# Patient Record
Sex: FEMALE | URBAN - METROPOLITAN AREA
[De-identification: names, ages, dates, MRNs, and addresses within clinical notes are randomized per-mention and may not be internally consistent; named-entity substitution may affect disease eponyms.]

---

## 2023-03-27 ENCOUNTER — OFFICE (OUTPATIENT)
Dept: URBAN - METROPOLITAN AREA CLINIC 109 | Facility: CLINIC | Age: 28
Setting detail: OPHTHALMOLOGY
End: 2023-03-27
Payer: COMMERCIAL

## 2023-03-27 DIAGNOSIS — H16.223: ICD-10-CM

## 2023-03-27 DIAGNOSIS — H52.13: ICD-10-CM

## 2023-03-27 DIAGNOSIS — H43.393: ICD-10-CM

## 2023-03-27 PROCEDURE — 92015 DETERMINE REFRACTIVE STATE: CPT | Performed by: OPHTHALMOLOGY

## 2023-03-27 PROCEDURE — 92004 COMPRE OPH EXAM NEW PT 1/>: CPT | Performed by: OPHTHALMOLOGY

## 2023-03-27 ASSESSMENT — REFRACTION_MANIFEST
OD_VA1: 20/20
OD_AXIS: 105
OS_SPHERE: -0.25
OS_AXIS: 95
OD_SPHERE: -0.50
OD_CYLINDER: -1.00
OS_CYLINDER: -0.75
OS_VA1: 20/20

## 2023-03-27 ASSESSMENT — CONFRONTATIONAL VISUAL FIELD TEST (CVF)
OD_FINDINGS: FULL
OS_FINDINGS: FULL

## 2023-03-27 ASSESSMENT — SPHEQUIV_DERIVED
OD_SPHEQUIV: -1.75
OS_SPHEQUIV: -1.625
OS_SPHEQUIV: -0.625
OD_SPHEQUIV: -1

## 2023-03-27 ASSESSMENT — REFRACTION_AUTOREFRACTION
OD_AXIS: 101
OS_CYLINDER: -0.75
OD_SPHERE: -1.50
OS_SPHERE: -1.25
OS_AXIS: 080
OD_CYLINDER: -0.50

## 2023-03-27 ASSESSMENT — VISUAL ACUITY
OS_BCVA: 20/20-
OD_BCVA: 20/20

## 2023-03-27 ASSESSMENT — REFRACTION_CURRENTRX
OD_AXIS: 105
OS_SPHERE: -0.25
OD_OVR_VA: 20/
OS_CYLINDER: -0.75
OS_OVR_VA: 20/
OS_AXIS: 095
OD_CYLINDER: -1.00
OD_SPHERE: PLANO

## 2023-04-17 ENCOUNTER — OFFICE (OUTPATIENT)
Dept: URBAN - METROPOLITAN AREA CLINIC 109 | Facility: CLINIC | Age: 28
Setting detail: OPHTHALMOLOGY
End: 2023-04-17
Payer: COMMERCIAL

## 2023-04-17 ENCOUNTER — APPOINTMENT (OUTPATIENT)
Dept: DERMATOLOGY | Facility: CLINIC | Age: 28
End: 2023-04-17
Payer: COMMERCIAL

## 2023-04-17 VITALS — WEIGHT: 150 LBS | HEIGHT: 65 IN | BODY MASS INDEX: 24.99 KG/M2

## 2023-04-17 DIAGNOSIS — L82.1 OTHER SEBORRHEIC KERATOSIS: ICD-10-CM

## 2023-04-17 DIAGNOSIS — H52.7: ICD-10-CM

## 2023-04-17 DIAGNOSIS — L85.3 XEROSIS CUTIS: ICD-10-CM

## 2023-04-17 PROCEDURE — 99203 OFFICE O/P NEW LOW 30 MIN: CPT

## 2023-04-17 PROCEDURE — GLASS CHEC GLASS RECHECK/ NO CHARGE: Performed by: OPHTHALMOLOGY

## 2023-04-17 ASSESSMENT — SPHEQUIV_DERIVED
OD_SPHEQUIV: -1.5
OS_SPHEQUIV: -1.375
OD_SPHEQUIV: -1
OS_SPHEQUIV: -0.625

## 2023-04-17 ASSESSMENT — REFRACTION_CURRENTRX
OD_SPHERE: PLANO
OS_CYLINDER: -0.75
OD_SPHERE: -0.50
OS_SPHERE: -0.25
OS_OVR_VA: 20/
OS_AXIS: 092
OD_AXIS: 105
OD_CYLINDER: -1.00
OS_AXIS: 88
OS_SPHERE: -0.25
OD_OVR_VA: 20/
OS_OVR_VA: 20/
OS_CYLINDER: -0.75
OD_OVR_VA: 20/
OD_CYLINDER: -1.00
OD_AXIS: 110

## 2023-04-17 ASSESSMENT — REFRACTION_MANIFEST
OS_AXIS: 95
OD_AXIS: 105
OD_SPHERE: -0.50
OS_SPHERE: -0.25
OS_CYLINDER: -0.75
OD_CYLINDER: -1.00
OD_VA1: 20/20
OS_VA1: 20/20

## 2023-04-17 ASSESSMENT — REFRACTION_AUTOREFRACTION
OD_CYLINDER: -0.50
OS_AXIS: 86
OD_SPHERE: -1.25
OS_CYLINDER: -0.75
OD_AXIS: 100
OS_SPHERE: -1.00

## 2023-04-17 ASSESSMENT — CONFRONTATIONAL VISUAL FIELD TEST (CVF)
OD_FINDINGS: FULL
OS_FINDINGS: FULL

## 2023-04-17 ASSESSMENT — VISUAL ACUITY
OS_BCVA: 20/20
OD_BCVA: 20/20

## 2023-04-17 NOTE — ASSESSMENT
[FreeTextEntry1] : #DPNs with skin tags,irritated - face and neck\par - Asx. I have discussed the nature and usual chronic course with the patient. Patient reassured\par - We discussed the risks, benefits, and alternatives of electrocautery \par - Discussed Emla and wrapping prior to electocautery\par - We discussed that treatment would be considered cosmetic with applicable out-of-pocket fees\par - $250 for 16-25 lesions. up to 16 is $210\par -Skin better hs  Alastin face cream qam as well recommended for skin texture\par -CeraVe cleanser\par - Patient c/o texture changes -- likely d/t small DPNs\par - Patient defers for now\par \par #Xerosis cutis\par - Discussed nature, chronicity and unpredictable course\par - Recommend liberal/frequent moisturization with non-fragranced creams (Cerave/Cetaphil creams) and lukewarm water for bathing. Counseled on the nature/course of this benign condition\par -Vanicream porducts\par \par #Horizontal neck lines\par - We discussed the risks, benefits, and botox - patient defers\par - Recommend OTC Alastin and Revision neck creams

## 2023-04-17 NOTE — PHYSICAL EXAM
[FreeTextEntry3] : tan stuck on papules on the face\par \par pedunculated dark brown papules on the neck and shoulders\par \par mild xerosis\par \par skin type IV\par \par static horizontal neck lines reviewed

## 2023-04-17 NOTE — HISTORY OF PRESENT ILLNESS
[FreeTextEntry1] : NPV spots [de-identified] : HANS CASTRO  is a pleasant 28 year old F, who presented for the following:\par \par - Bumps on her cheek/face. Growing more recently. Asx. Has had spots like these ever since she was 19.\par \par - Wishes to discuss general dry skin care \par \par - Asks about lines on the neck\par \par No p/f hx of skin cancer.\par

## 2023-06-13 ENCOUNTER — APPOINTMENT (OUTPATIENT)
Dept: DERMATOLOGY | Facility: CLINIC | Age: 28
End: 2023-06-13

## 2023-07-23 ENCOUNTER — NON-APPOINTMENT (OUTPATIENT)
Age: 28
End: 2023-07-23

## 2023-10-01 ENCOUNTER — EMERGENCY (EMERGENCY)
Facility: HOSPITAL | Age: 28
LOS: 1 days | Discharge: HOME | End: 2023-10-01
Attending: EMERGENCY MEDICINE | Admitting: EMERGENCY MEDICINE
Payer: COMMERCIAL

## 2023-10-01 VITALS
DIASTOLIC BLOOD PRESSURE: 74 MMHG | HEART RATE: 70 BPM | OXYGEN SATURATION: 97 % | RESPIRATION RATE: 19 BRPM | SYSTOLIC BLOOD PRESSURE: 101 MMHG | TEMPERATURE: 98 F

## 2023-10-01 VITALS
WEIGHT: 164.91 LBS | SYSTOLIC BLOOD PRESSURE: 103 MMHG | DIASTOLIC BLOOD PRESSURE: 75 MMHG | HEIGHT: 65 IN | TEMPERATURE: 97 F | RESPIRATION RATE: 17 BRPM | OXYGEN SATURATION: 96 % | HEART RATE: 74 BPM

## 2023-10-01 LAB
ALBUMIN SERPL ELPH-MCNC: 3.7 G/DL — SIGNIFICANT CHANGE UP (ref 3.3–5)
ALP SERPL-CCNC: 60 U/L — SIGNIFICANT CHANGE UP (ref 40–120)
ALT FLD-CCNC: 18 U/L — SIGNIFICANT CHANGE UP (ref 12–78)
ANION GAP SERPL CALC-SCNC: 7 MMOL/L — SIGNIFICANT CHANGE UP (ref 5–17)
AST SERPL-CCNC: 11 U/L — LOW (ref 15–37)
BASOPHILS # BLD AUTO: 0.03 K/UL — SIGNIFICANT CHANGE UP (ref 0–0.2)
BASOPHILS NFR BLD AUTO: 0.4 % — SIGNIFICANT CHANGE UP (ref 0–2)
BILIRUB SERPL-MCNC: 0.8 MG/DL — SIGNIFICANT CHANGE UP (ref 0.2–1.2)
BUN SERPL-MCNC: 13 MG/DL — SIGNIFICANT CHANGE UP (ref 7–23)
CALCIUM SERPL-MCNC: 8.6 MG/DL — SIGNIFICANT CHANGE UP (ref 8.5–10.1)
CHLORIDE SERPL-SCNC: 107 MMOL/L — SIGNIFICANT CHANGE UP (ref 96–108)
CO2 SERPL-SCNC: 24 MMOL/L — SIGNIFICANT CHANGE UP (ref 22–31)
CREAT SERPL-MCNC: 0.6 MG/DL — SIGNIFICANT CHANGE UP (ref 0.5–1.3)
EGFR: 126 ML/MIN/1.73M2 — SIGNIFICANT CHANGE UP
EOSINOPHIL # BLD AUTO: 0.09 K/UL — SIGNIFICANT CHANGE UP (ref 0–0.5)
EOSINOPHIL NFR BLD AUTO: 1.3 % — SIGNIFICANT CHANGE UP (ref 0–6)
GLUCOSE SERPL-MCNC: 110 MG/DL — HIGH (ref 70–99)
HCG SERPL-ACNC: <1 MIU/ML — SIGNIFICANT CHANGE UP
HCT VFR BLD CALC: 38.7 % — SIGNIFICANT CHANGE UP (ref 34.5–45)
HGB BLD-MCNC: 12.9 G/DL — SIGNIFICANT CHANGE UP (ref 11.5–15.5)
IMM GRANULOCYTES NFR BLD AUTO: 0.3 % — SIGNIFICANT CHANGE UP (ref 0–0.9)
LYMPHOCYTES # BLD AUTO: 1.67 K/UL — SIGNIFICANT CHANGE UP (ref 1–3.3)
LYMPHOCYTES # BLD AUTO: 24.8 % — SIGNIFICANT CHANGE UP (ref 13–44)
MCHC RBC-ENTMCNC: 27.9 PG — SIGNIFICANT CHANGE UP (ref 27–34)
MCHC RBC-ENTMCNC: 33.3 GM/DL — SIGNIFICANT CHANGE UP (ref 32–36)
MCV RBC AUTO: 83.6 FL — SIGNIFICANT CHANGE UP (ref 80–100)
MONOCYTES # BLD AUTO: 0.25 K/UL — SIGNIFICANT CHANGE UP (ref 0–0.9)
MONOCYTES NFR BLD AUTO: 3.7 % — SIGNIFICANT CHANGE UP (ref 2–14)
NEUTROPHILS # BLD AUTO: 4.67 K/UL — SIGNIFICANT CHANGE UP (ref 1.8–7.4)
NEUTROPHILS NFR BLD AUTO: 69.5 % — SIGNIFICANT CHANGE UP (ref 43–77)
NRBC # BLD: 0 /100 WBCS — SIGNIFICANT CHANGE UP (ref 0–0)
PLATELET # BLD AUTO: 275 K/UL — SIGNIFICANT CHANGE UP (ref 150–400)
POTASSIUM SERPL-MCNC: 3.9 MMOL/L — SIGNIFICANT CHANGE UP (ref 3.5–5.3)
POTASSIUM SERPL-SCNC: 3.9 MMOL/L — SIGNIFICANT CHANGE UP (ref 3.5–5.3)
PROT SERPL-MCNC: 7.6 G/DL — SIGNIFICANT CHANGE UP (ref 6–8.3)
RBC # BLD: 4.63 M/UL — SIGNIFICANT CHANGE UP (ref 3.8–5.2)
RBC # FLD: 12.2 % — SIGNIFICANT CHANGE UP (ref 10.3–14.5)
SODIUM SERPL-SCNC: 138 MMOL/L — SIGNIFICANT CHANGE UP (ref 135–145)
WBC # BLD: 6.73 K/UL — SIGNIFICANT CHANGE UP (ref 3.8–10.5)
WBC # FLD AUTO: 6.73 K/UL — SIGNIFICANT CHANGE UP (ref 3.8–10.5)

## 2023-10-01 PROCEDURE — 84702 CHORIONIC GONADOTROPIN TEST: CPT

## 2023-10-01 PROCEDURE — 99284 EMERGENCY DEPT VISIT MOD MDM: CPT

## 2023-10-01 PROCEDURE — 36415 COLL VENOUS BLD VENIPUNCTURE: CPT

## 2023-10-01 PROCEDURE — 80053 COMPREHEN METABOLIC PANEL: CPT

## 2023-10-01 PROCEDURE — 96374 THER/PROPH/DIAG INJ IV PUSH: CPT

## 2023-10-01 PROCEDURE — 96375 TX/PRO/DX INJ NEW DRUG ADDON: CPT

## 2023-10-01 PROCEDURE — 85025 COMPLETE CBC W/AUTO DIFF WBC: CPT

## 2023-10-01 PROCEDURE — 96361 HYDRATE IV INFUSION ADD-ON: CPT

## 2023-10-01 PROCEDURE — 99284 EMERGENCY DEPT VISIT MOD MDM: CPT | Mod: 25

## 2023-10-01 RX ORDER — KETOROLAC TROMETHAMINE 30 MG/ML
30 SYRINGE (ML) INJECTION ONCE
Refills: 0 | Status: DISCONTINUED | OUTPATIENT
Start: 2023-10-01 | End: 2023-10-01

## 2023-10-01 RX ORDER — ONDANSETRON 8 MG/1
4 TABLET, FILM COATED ORAL ONCE
Refills: 0 | Status: COMPLETED | OUTPATIENT
Start: 2023-10-01 | End: 2023-10-01

## 2023-10-01 RX ORDER — METOCLOPRAMIDE HCL 10 MG
10 TABLET ORAL ONCE
Refills: 0 | Status: COMPLETED | OUTPATIENT
Start: 2023-10-01 | End: 2023-10-01

## 2023-10-01 RX ORDER — ACETAMINOPHEN 500 MG
1000 TABLET ORAL ONCE
Refills: 0 | Status: COMPLETED | OUTPATIENT
Start: 2023-10-01 | End: 2023-10-01

## 2023-10-01 RX ORDER — SODIUM CHLORIDE 9 MG/ML
1000 INJECTION INTRAMUSCULAR; INTRAVENOUS; SUBCUTANEOUS ONCE
Refills: 0 | Status: COMPLETED | OUTPATIENT
Start: 2023-10-01 | End: 2023-10-01

## 2023-10-01 RX ADMIN — SODIUM CHLORIDE 1000 MILLILITER(S): 9 INJECTION INTRAMUSCULAR; INTRAVENOUS; SUBCUTANEOUS at 13:36

## 2023-10-01 RX ADMIN — SODIUM CHLORIDE 1000 MILLILITER(S): 9 INJECTION INTRAMUSCULAR; INTRAVENOUS; SUBCUTANEOUS at 15:17

## 2023-10-01 RX ADMIN — Medication 400 MILLIGRAM(S): at 13:35

## 2023-10-01 RX ADMIN — Medication 1000 MILLIGRAM(S): at 13:50

## 2023-10-01 RX ADMIN — SODIUM CHLORIDE 1000 MILLILITER(S): 9 INJECTION INTRAMUSCULAR; INTRAVENOUS; SUBCUTANEOUS at 12:23

## 2023-10-01 RX ADMIN — Medication 1000 MILLIGRAM(S): at 14:05

## 2023-10-01 RX ADMIN — Medication 10 MILLIGRAM(S): at 12:23

## 2023-10-01 RX ADMIN — SODIUM CHLORIDE 1000 MILLILITER(S): 9 INJECTION INTRAMUSCULAR; INTRAVENOUS; SUBCUTANEOUS at 13:30

## 2023-10-01 RX ADMIN — ONDANSETRON 4 MILLIGRAM(S): 8 TABLET, FILM COATED ORAL at 13:35

## 2023-10-01 RX ADMIN — Medication 30 MILLIGRAM(S): at 13:36

## 2023-10-01 RX ADMIN — Medication 30 MILLIGRAM(S): at 14:00

## 2023-10-01 NOTE — ED PROVIDER NOTE - CARE PROVIDER_API CALL
Cj Rodriguez  Neurology  [address] [place] [address]  Phone: [phone]  Fax: [phone]  Follow Up Time: 1-3 Days

## 2023-10-01 NOTE — ED PROVIDER NOTE - NS ED ATTENDING STATEMENT MOD
This was a shared visit with the LFORENTINO. I reviewed and verified the documentation and independently performed the documented:

## 2023-10-01 NOTE — ED PROVIDER NOTE - PATIENT PORTAL LINK FT
You can access the FollowMyHealth Patient Portal offered by [facility] by registering at the following website: [email or web address]. By joining [employer] [facility]’s FollowMyHealth portal, you will also be able to view your health information using other applications (apps) compatible with our system.

## 2023-10-01 NOTE — ED PROVIDER NOTE - PROGRESS NOTE DETAILS
Reevaluated patient at bedside. Patient notes improvement of symptoms. Discussed results with the patient and provided copies. [place] agreement to defer CT, pt reports she will return for persistent or worsening symptoms and prefers to follow up with neuro for MRI. All questions were answered. Discussed the importance of prompt, close medical follow-up. Patient will return with any changes, concerns or persistent/worsening symptoms.  Patient verbalized understanding.

## 2023-10-01 NOTE — ED PROVIDER NOTE - PHYSICAL EXAMINATION
Constitutional: Awake, Alert, non-toxic. NAD  HEAD: Normocephalic, atraumatic.   EYES: PERRL, EOM intact, conjunctiva and sclera are clear bilaterally. No raccoon eyes.   ENT: No rhinorrhea, normal pharynx, patent, no tonsillar exudate or enlargement, mucous membranes pink/moist, no erythema, no drooling or stridor.   NECK: Supple, non-tender  BACK: No midline or paraspinal TTP of cervical/thoracic/lumbar spine, FROM. No ecchymosis or hematomas.   CARDIOVASCULAR: Normal S1, S2; regular rate and rhythm.  RESPIRATORY: Normal respiratory effort; breath sounds CTAB, no wheezes, rhonchi, or rales. Speaking in full sentences. No accessory muscle use.   ABDOMEN: Soft; non-tender, non-distended  EXTREMITIES: Full passive and active ROM in all extremities; non-tender to palpation; distal pulses palpable and symmetric, no edema, no crepitus or step off  SKIN: Warm, dry; good skin turgor, no apparent lesions or rashes, no ecchymosis, brisk capillary refill.  NEURO: A&O x3. Sensory and motor functions are grossly intact. Speech is normal. Appearance and judgement seem appropriate for gender and age. No neurological deficits. Neurovascular sensation intact motor function 5/5 of upper and lower extremities, CN II-XII grossly intact, no ataxia, absent pronator drift, intact cerebellar function. Speech clear, without articulation or word-finding difficulties. Eyes- PERRL bilaterally. EOMs in tact. No nystagmus. No facial droop.

## 2023-10-01 NOTE — ED PROVIDER NOTE - CLINICAL SUMMARY MEDICAL DECISION MAKING FREE TEXT BOX
28-year-old female significant past medical history for migraine headaches presents to the ED with complaints of similar left-sided migraine headache times few days however now associated with nausea and vomiting.  Patient unable to tolerate her Advil secondary to nausea and vomiting.  Supportive care symptomatic relief and reassess for suspected side effects to migraine headache.

## 2023-10-01 NOTE — ED ADULT NURSE NOTE - OBJECTIVE STATEMENT
Pt. received alert and oriented x3 with chief complaint of multiple episodes of n/v since this morning.

## 2023-10-01 NOTE — ED PROVIDER NOTE - OBJECTIVE STATEMENT
28-year-old female without reported past medical history presents today due to headache and vomiting.  Patient reports she woke up at 9 AM with a right-sided temporal headache that is described as throbbing and 8 out of 10.  Patient reports that she vomited about 5 times.  Patient notes that she typically gets these headaches but presented today due to the vomiting.  Patient reports she also has a history of right-sided jaw issues in which she wears a nightguard.  Patient denies worst headache of life, numbness, weakness, visual change, slurred speech, facial droop, neck stiffness, rash, fever, cough, runny nose, or any other complaints.

## 2023-10-01 NOTE — ED ADULT NURSE NOTE - NSFALLUNIVINTERV_ED_ALL_ED
Bed/Stretcher in lowest position, wheels locked, appropriate side rails in place/Call bell, personal items and telephone in reach/Instruct patient to call for assistance before getting out of bed/chair/stretcher/Non-slip footwear applied when patient is off stretcher/[place] to call system/Physically safe environment - no spills, clutter or unnecessary equipment/Purposeful proactive rounding/Room/bathroom lighting operational, light cord in reach

## 2023-10-01 NOTE — ED PROVIDER NOTE - NSFOLLOWUPINSTRUCTIONS_ED_ALL_ED_FT
Migraine Headache  A migraine headache is an intense, throbbing pain on one side or both sides of the head. Migraine headaches may also cause other symptoms, such as nausea, vomiting, and sensitivity to light and noise. A migraine headache can last from 4 hours to 3 days. Talk with your doctor about what things may bring on (trigger) your migraine headaches.    What are the causes?  The exact cause of this condition is not known. However, a migraine may be caused when nerves in the brain become irritated and release chemicals that cause inflammation of blood vessels. This inflammation causes pain. This condition may be triggered or caused by:  Drinking alcohol.  Smoking.  Taking medicines, such as:  Medicine used to treat chest pain (nitroglycerin).  Birth control pills.  Estrogen.  Certain blood pressure medicines.  Eating or drinking products that contain nitrates, glutamate, aspartame, or tyramine. Aged cheeses, chocolate, or caffeine may also be triggers.  Doing physical activity.  Other things that may trigger a migraine headache include:  Menstruation.  Pregnancy.  Hunger.  Stress.  Lack of sleep or too much sleep.  Weather changes.  Fatigue.  What increases the risk?  The following factors may make you more likely to experience migraine headaches:  Being a certain age. This condition is more common in people who are 25–55 years old.  Being female.  Having a family history of migraine headaches.  Being .  Having a mental health condition, such as depression or anxiety.  Being obese.  What are the signs or symptoms?  The main symptom of this condition is pulsating or throbbing pain. This pain may:  Happen in any area of the head, such as on one side or both sides.  Interfere with daily activities.  Get worse with physical activity.  Get worse with exposure to bright lights or loud noises.  Other symptoms may include:  Nausea.  Vomiting.  Dizziness.  General sensitivity to bright lights, loud noises, or smells.  Before you get a migraine headache, you may get warning signs (an aura). An aura may include:  Seeing flashing lights or having blind spots.  Seeing bright spots, halos, or zigzag lines.  Having tunnel vision or blurred vision.  Having numbness or a tingling feeling.  Having trouble talking.  Having muscle weakness.  Some people have symptoms after a migraine headache (postdromal phase), such as:  Feeling tired.  Difficulty concentrating.  How is this diagnosed?  A migraine headache can be diagnosed based on:  Your symptoms.  A physical exam.  Tests, such as:  CT scan or an MRI of the head. These imaging tests can help rule out other causes of headaches.  Taking fluid from the spine (lumbar puncture) and analyzing it (cerebrospinal fluid analysis, or CSF analysis).  How is this treated?  This condition may be treated with medicines that:  Relieve pain.  Relieve nausea.  Prevent migraine headaches.  Treatment for this condition may also include:  Acupuncture.  Lifestyle changes like avoiding foods that trigger migraine headaches.  Biofeedback.  Cognitive behavioral therapy.  Follow these instructions at home:  Medicines    Take over-the-counter and prescription medicines only as told by your health care provider.  Ask your health care provider if the medicine prescribed to you:  Requires you to avoid driving or using heavy machinery.  Can cause constipation. You may need to take these actions to prevent or treat constipation:  Drink enough fluid to keep your urine pale yellow.  Take over-the-counter or prescription medicines.  Eat foods that are high in fiber, such as beans, whole grains, and fresh fruits and vegetables.  Limit foods that are high in fat and processed sugars, such as fried or sweet foods.  Lifestyle    Do not drink alcohol.  Do not use any products that contain nicotine or tobacco, such as cigarettes, e-cigarettes, and chewing tobacco. If you need help quitting, ask your health care provider.  Get at least 8 hours of sleep every night.  Find ways to manage stress, such as meditation, deep breathing, or yoga.  General instructions    Keep a journal to find out what may trigger your migraine headaches. For example, write down:  What you eat and drink.  How much sleep you get.  Any change to your diet or medicines.  If you have a migraine headache:  Avoid things that make your symptoms worse, such as bright lights.  It may help to lie down in a dark, quiet room.  Do not drive or use heavy machinery.  Ask your health care provider what activities are safe for you while you are experiencing symptoms.  Keep all follow-up visits as told by your health care provider. This is important.  Contact a health care provider if:  You develop symptoms that are different or more severe than your usual migraine headache symptoms.  You have more than 15 headache days in one month.  Get help right away if:  Your migraine headache becomes severe.  Your migraine headache lasts longer than 72 hours.  You have a fever.  You have a stiff neck.  You have vision loss.  Your muscles feel weak or like you cannot control them.  You start to lose your balance often.  You have trouble walking.  You faint.  You have a seizure.  Summary  A migraine headache is an intense, throbbing pain on one side or both sides of the head. Migraines may also cause other symptoms, such as nausea, vomiting, and sensitivity to light and noise.  This condition may be treated with medicines and lifestyle changes. You may also need to avoid certain things that trigger a migraine headache.  Keep a journal to find out what may trigger your migraine headaches.  Contact your health care provider if you have more than 15 headache days in a month or you develop symptoms that are different or more severe than your usual migraine headache symptoms.  This information is not intended to replace advice given to you by your health care provider. Make sure you discuss any questions you have with your health care provider.    Document Revised: 06/01/2023 Document Reviewed: 01/30/2020 Follow up with your primary care doctor and neurology. Return for persistent headache, vomiting, numbness/weakness, worsening condition.       Migraine Headache    A migraine headache is an intense, throbbing pain on one side or both sides of the head. Migraine headaches may also cause other symptoms, such as nausea, vomiting, and sensitivity to light and noise. A migraine headache can last from 4 hours to 3 days. Talk with your doctor about what things may bring on (trigger) your migraine headaches.    What are the causes?  The exact cause of this condition is not known. However, a migraine may be caused when nerves in the brain become irritated and release chemicals that cause inflammation of blood vessels. This inflammation causes pain. This condition may be triggered or caused by:  Drinking alcohol.  Smoking.  Taking medicines, such as:  Medicine used to treat chest pain (nitroglycerin).  Birth control pills.  Estrogen.  Certain blood pressure medicines.  Eating or drinking products that contain nitrates, glutamate, aspartame, or tyramine. Aged cheeses, chocolate, or caffeine may also be triggers.  Doing physical activity.  Other things that may trigger a migraine headache include:  Menstruation.  Pregnancy.  Hunger.  Stress.  Lack of sleep or too much sleep.  Weather changes.  Fatigue.  What increases the risk?  The following factors may make you more likely to experience migraine headaches:  Being a certain age. This condition is more common in people who are 25–55 years old.  Being female.  Having a family history of migraine headaches.  Being .  Having a mental health condition, such as depression or anxiety.  Being obese.  What are the signs or symptoms?  The main symptom of this condition is pulsating or throbbing pain. This pain may:  Happen in any area of the head, such as on one side or both sides.  Interfere with daily activities.  Get worse with physical activity.  Get worse with exposure to bright lights or loud noises.  Other symptoms may include:  Nausea.  Vomiting.  Dizziness.  General sensitivity to bright lights, loud noises, or smells.  Before you get a migraine headache, you may get warning signs (an aura). An aura may include:  Seeing flashing lights or having blind spots.  Seeing bright spots, halos, or zigzag lines.  Having tunnel vision or blurred vision.  Having numbness or a tingling feeling.  Having trouble talking.  Having muscle weakness.  Some people have symptoms after a migraine headache (postdromal phase), such as:  Feeling tired.  Difficulty concentrating.  How is this diagnosed?  A migraine headache can be diagnosed based on:  Your symptoms.  A physical exam.  Tests, such as:  CT scan or an MRI of the head. These imaging tests can help rule out other causes of headaches.  Taking fluid from the spine (lumbar puncture) and analyzing it (cerebrospinal fluid analysis, or CSF analysis).  How is this treated?  This condition may be treated with medicines that:  Relieve pain.  Relieve nausea.  Prevent migraine headaches.  Treatment for this condition may also include:  Acupuncture.  Lifestyle changes like avoiding foods that trigger migraine headaches.  Biofeedback.  Cognitive behavioral therapy.  Follow these instructions at home:  Medicines    Take over-the-counter and prescription medicines only as told by your health care provider.  Ask your health care provider if the medicine prescribed to you:  Requires you to avoid driving or using heavy machinery.  Can cause constipation. You may need to take these actions to prevent or treat constipation:  Drink enough fluid to keep your urine pale yellow.  Take over-the-counter or prescription medicines.  Eat foods that are high in fiber, such as beans, whole grains, and fresh fruits and vegetables.  Limit foods that are high in fat and processed sugars, such as fried or sweet foods.  Lifestyle    Do not drink alcohol.  Do not use any products that contain nicotine or tobacco, such as cigarettes, e-cigarettes, and chewing tobacco. If you need help quitting, ask your health care provider.  Get at least 8 hours of sleep every night.  Find ways to manage stress, such as meditation, deep breathing, or yoga.  General instructions    Keep a journal to find out what may trigger your migraine headaches. For example, write down:  What you eat and drink.  How much sleep you get.  Any change to your diet or medicines.  If you have a migraine headache:  Avoid things that make your symptoms worse, such as bright lights.  It may help to lie down in a dark, quiet room.  Do not drive or use heavy machinery.  Ask your health care provider what activities are safe for you while you are experiencing symptoms.  Keep all follow-up visits as told by your health care provider. This is important.  Contact a health care provider if:  You develop symptoms that are different or more severe than your usual migraine headache symptoms.  You have more than 15 headache days in one month.  Get help right away if:  Your migraine headache becomes severe.  Your migraine headache lasts longer than 72 hours.  You have a fever.  You have a stiff neck.  You have vision loss.  Your muscles feel weak or like you cannot control them.  You start to lose your balance often.  You have trouble walking.  You faint.  You have a seizure.  Summary  A migraine headache is an intense, throbbing pain on one side or both sides of the head. Migraines may also cause other symptoms, such as nausea, vomiting, and sensitivity to light and noise.  This condition may be treated with medicines and lifestyle changes. You may also need to avoid certain things that trigger a migraine headache.  Keep a journal to find out what may trigger your migraine headaches.  Contact your health care provider if you have more than 15 headache days in a month or you develop symptoms that are different or more severe than your usual migraine headache symptoms.  This information is not intended to replace advice given to you by your health care provider. Make sure you discuss any questions you have with your health care provider.    Document Revised: 06/01/2023 Document Reviewed: 01/30/2020

## 2023-10-24 ENCOUNTER — APPOINTMENT (OUTPATIENT)
Dept: FAMILY MEDICINE | Facility: CLINIC | Age: 28
End: 2023-10-24

## 2023-12-19 ENCOUNTER — APPOINTMENT (OUTPATIENT)
Dept: OTOLARYNGOLOGY | Facility: CLINIC | Age: 28
End: 2023-12-19
Payer: COMMERCIAL

## 2023-12-19 ENCOUNTER — NON-APPOINTMENT (OUTPATIENT)
Age: 28
End: 2023-12-19

## 2023-12-19 VITALS
WEIGHT: 165 LBS | DIASTOLIC BLOOD PRESSURE: 75 MMHG | HEART RATE: 86 BPM | HEIGHT: 65 IN | SYSTOLIC BLOOD PRESSURE: 111 MMHG | BODY MASS INDEX: 27.49 KG/M2

## 2023-12-19 DIAGNOSIS — J31.0 CHRONIC RHINITIS: ICD-10-CM

## 2023-12-19 PROCEDURE — 99203 OFFICE O/P NEW LOW 30 MIN: CPT | Mod: 25

## 2023-12-19 PROCEDURE — 31231 NASAL ENDOSCOPY DX: CPT

## 2023-12-19 NOTE — REVIEW OF SYSTEMS
[Sneezing] : sneezing [Seasonal Allergies] : seasonal allergies [Lightheadedness] : lightheadedness [Ear Noises] : ear noises [Problem Snoring] : problem snoring [Throat Clearing] : throat clearing [Throat Pain] : throat pain [Throat Dryness] : throat dryness [Eyes Itch] : itching of the eyes [Negative] : Heme/Lymph [Patient Intake Form Reviewed] : Patient intake form was reviewed [FreeTextEntry1] : daytime sleepiness

## 2023-12-19 NOTE — REASON FOR VISIT
[Initial Evaluation] : an initial evaluation for [FreeTextEntry2] : sinus congestion, throat possible tonsil stones

## 2023-12-19 NOTE — ASSESSMENT
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

## 2023-12-19 NOTE — HISTORY OF PRESENT ILLNESS
[de-identified] : THROAT DISCOMFORT SOMETIME MEDICAL HX REVIEWED NASAL CONGESTION SOMETIME BAD BREATH AT TIMES

## 2023-12-19 NOTE — PHYSICAL EXAM
[de-identified] : MILD CONGESTION [Normal] : mucosa is normal [Midline] : trachea located in midline position

## 2023-12-28 ENCOUNTER — APPOINTMENT (OUTPATIENT)
Dept: FAMILY MEDICINE | Facility: CLINIC | Age: 28
End: 2023-12-28
Payer: COMMERCIAL

## 2023-12-28 VITALS
TEMPERATURE: 97.4 F | BODY MASS INDEX: 26.66 KG/M2 | OXYGEN SATURATION: 98 % | HEIGHT: 65 IN | WEIGHT: 160 LBS | DIASTOLIC BLOOD PRESSURE: 80 MMHG | SYSTOLIC BLOOD PRESSURE: 100 MMHG | HEART RATE: 78 BPM

## 2023-12-28 DIAGNOSIS — R53.83 OTHER FATIGUE: ICD-10-CM

## 2023-12-28 DIAGNOSIS — Z00.00 ENCOUNTER FOR GENERAL ADULT MEDICAL EXAMINATION W/OUT ABNORMAL FINDINGS: ICD-10-CM

## 2023-12-28 PROCEDURE — 99385 PREV VISIT NEW AGE 18-39: CPT

## 2023-12-28 NOTE — HEALTH RISK ASSESSMENT
[No] : No [Current] : Current [0] : 2) Feeling down, depressed, or hopeless: Not at all (0) [PHQ-2 Negative - No further assessment needed] : PHQ-2 Negative - No further assessment needed [Good] : ~his/her~  mood as  good [No falls in past year] : Patient reported no falls in the past year [[id number]] : 0 [Patient reported PAP Smear was normal] : Patient reported PAP Smear was normal [PapSmearDate] : 1/2020 [de-identified] : Hookah with tobacco 2x per month

## 2023-12-28 NOTE — PLAN
[FreeTextEntry1] : #HCM # Fatigue - vitals stable, PE unremarkable - FU labs - due for pap smear, will give name of GYN (Han Tovar) - pt declines flu shot - counselled on smoking cessation

## 2023-12-28 NOTE — REVIEW OF SYSTEMS
[Fatigue] : fatigue [Recent Change In Weight] : ~T recent weight change [Nasal Discharge] : nasal discharge [Negative] : Heme/Lymph [Fever] : no fever [Chills] : no chills [Earache] : no earache [Sore Throat] : no sore throat [Hair Changes] : no hair changes [Skin Rash] : no skin rash [de-identified] : + dry skin

## 2023-12-28 NOTE — HISTORY OF PRESENT ILLNESS
[FreeTextEntry1] : CPE [de-identified] : 27 yo female with no pMHx presents as new pt for CPE. She complains of feeling cold easily and has intermittent fatigue x 6 months. She has recent weight gain, but is now working from home and has decreased activity level. She thinks she has gained 20 lbs in the past year. She noticed increased dry skin with patches on her face. Denies constipation, heavy menstrual cycles. 1 week ago she saw ENT Dr. Dickens ([facility]) because she had sensation of having something caught in her throat. Everything was normal. Last pap was a couple years ago. She will FU with GYN. She does not take any medications or supplements. Not sexually active. She has stopped exercising 1 month ago. Denies flu shot

## 2024-01-03 LAB
25(OH)D3 SERPL-MCNC: 16.4 NG/ML
ALBUMIN SERPL ELPH-MCNC: 4.7 G/DL
ALP BLD-CCNC: 67 U/L
ALT SERPL-CCNC: 8 U/L
ANION GAP SERPL CALC-SCNC: 12 MMOL/L
AST SERPL-CCNC: 13 U/L
BILIRUB SERPL-MCNC: 0.8 MG/DL
BUN SERPL-MCNC: 14 MG/DL
CALCIUM SERPL-MCNC: 9.9 MG/DL
CHLORIDE SERPL-SCNC: 102 MMOL/L
CHOLEST SERPL-MCNC: 175 MG/DL
CO2 SERPL-SCNC: 25 MMOL/L
CREAT SERPL-MCNC: 0.62 MG/DL
EGFR: 125 ML/MIN/1.73M2
ESTIMATED AVERAGE GLUCOSE: 103 MG/DL
FOLATE SERPL-MCNC: 11.8 NG/ML
GLUCOSE SERPL-MCNC: 89 MG/DL
HBA1C MFR BLD HPLC: 5.2 %
HCT VFR BLD CALC: 43 %
HDLC SERPL-MCNC: 56 MG/DL
HGB BLD-MCNC: 13.6 G/DL
LDLC SERPL CALC-MCNC: 106 MG/DL
MCHC RBC-ENTMCNC: 27.3 PG
MCHC RBC-ENTMCNC: 31.6 GM/DL
MCV RBC AUTO: 86.3 FL
NONHDLC SERPL-MCNC: 118 MG/DL
PLATELET # BLD AUTO: 360 K/UL
POTASSIUM SERPL-SCNC: 4.6 MMOL/L
PROT SERPL-MCNC: 7.5 G/DL
RBC # BLD: 4.98 M/UL
RBC # FLD: 12.4 %
SODIUM SERPL-SCNC: 138 MMOL/L
TRIGL SERPL-MCNC: 62 MG/DL
TSH SERPL-ACNC: 1.76 UIU/ML
VIT B12 SERPL-MCNC: 372 PG/ML
WBC # FLD AUTO: 7.55 K/UL

## 2024-12-12 ENCOUNTER — EMERGENCY (EMERGENCY)
Facility: HOSPITAL | Age: 29
LOS: 1 days | Discharge: HOME | End: 2024-12-12
Attending: STUDENT IN AN ORGANIZED HEALTH CARE EDUCATION/TRAINING PROGRAM | Admitting: STUDENT IN AN ORGANIZED HEALTH CARE EDUCATION/TRAINING PROGRAM
Payer: COMMERCIAL

## 2024-12-12 VITALS
DIASTOLIC BLOOD PRESSURE: 76 MMHG | SYSTOLIC BLOOD PRESSURE: 113 MMHG | TEMPERATURE: 98 F | HEIGHT: 65 IN | HEART RATE: 79 BPM | OXYGEN SATURATION: 100 % | RESPIRATION RATE: 16 BRPM | WEIGHT: 160.06 LBS

## 2024-12-12 VITALS
RESPIRATION RATE: 18 BRPM | SYSTOLIC BLOOD PRESSURE: 112 MMHG | DIASTOLIC BLOOD PRESSURE: 63 MMHG | TEMPERATURE: 98 F | HEART RATE: 73 BPM | OXYGEN SATURATION: 100 %

## 2024-12-12 LAB
ANION GAP SERPL CALC-SCNC: 7 MMOL/L — SIGNIFICANT CHANGE UP (ref 5–17)
BASOPHILS # BLD AUTO: 0.03 K/UL — SIGNIFICANT CHANGE UP (ref 0–0.2)
BASOPHILS NFR BLD AUTO: 0.3 % — SIGNIFICANT CHANGE UP (ref 0–2)
BUN SERPL-MCNC: 13 MG/DL — SIGNIFICANT CHANGE UP (ref 7–23)
CALCIUM SERPL-MCNC: 9.2 MG/DL — SIGNIFICANT CHANGE UP (ref 8.5–10.1)
CHLORIDE SERPL-SCNC: 104 MMOL/L — SIGNIFICANT CHANGE UP (ref 96–108)
CO2 SERPL-SCNC: 28 MMOL/L — SIGNIFICANT CHANGE UP (ref 22–31)
CREAT SERPL-MCNC: 0.76 MG/DL — SIGNIFICANT CHANGE UP (ref 0.5–1.3)
EGFR: 109 ML/MIN/1.73M2 — SIGNIFICANT CHANGE UP
EOSINOPHIL # BLD AUTO: 0.07 K/UL — SIGNIFICANT CHANGE UP (ref 0–0.5)
EOSINOPHIL NFR BLD AUTO: 0.7 % — SIGNIFICANT CHANGE UP (ref 0–6)
GLUCOSE SERPL-MCNC: 94 MG/DL — SIGNIFICANT CHANGE UP (ref 70–99)
HCG SERPL-ACNC: <1 MIU/ML — SIGNIFICANT CHANGE UP
HCT VFR BLD CALC: 41.2 % — SIGNIFICANT CHANGE UP (ref 34.5–45)
HGB BLD-MCNC: 13.5 G/DL — SIGNIFICANT CHANGE UP (ref 11.5–15.5)
IMM GRANULOCYTES NFR BLD AUTO: 0.3 % — SIGNIFICANT CHANGE UP (ref 0–0.9)
LYMPHOCYTES # BLD AUTO: 2.78 K/UL — SIGNIFICANT CHANGE UP (ref 1–3.3)
LYMPHOCYTES # BLD AUTO: 26.1 % — SIGNIFICANT CHANGE UP (ref 13–44)
MCHC RBC-ENTMCNC: 27.5 PG — SIGNIFICANT CHANGE UP (ref 27–34)
MCHC RBC-ENTMCNC: 32.8 G/DL — SIGNIFICANT CHANGE UP (ref 32–36)
MCV RBC AUTO: 83.9 FL — SIGNIFICANT CHANGE UP (ref 80–100)
MONOCYTES # BLD AUTO: 0.51 K/UL — SIGNIFICANT CHANGE UP (ref 0–0.9)
MONOCYTES NFR BLD AUTO: 4.8 % — SIGNIFICANT CHANGE UP (ref 2–14)
NEUTROPHILS # BLD AUTO: 7.22 K/UL — SIGNIFICANT CHANGE UP (ref 1.8–7.4)
NEUTROPHILS NFR BLD AUTO: 67.8 % — SIGNIFICANT CHANGE UP (ref 43–77)
NRBC # BLD: 0 /100 WBCS — SIGNIFICANT CHANGE UP (ref 0–0)
PLATELET # BLD AUTO: 346 K/UL — SIGNIFICANT CHANGE UP (ref 150–400)
POTASSIUM SERPL-MCNC: 3.9 MMOL/L — SIGNIFICANT CHANGE UP (ref 3.5–5.3)
POTASSIUM SERPL-SCNC: 3.9 MMOL/L — SIGNIFICANT CHANGE UP (ref 3.5–5.3)
RBC # BLD: 4.91 M/UL — SIGNIFICANT CHANGE UP (ref 3.8–5.2)
RBC # FLD: 12.2 % — SIGNIFICANT CHANGE UP (ref 10.3–14.5)
SODIUM SERPL-SCNC: 139 MMOL/L — SIGNIFICANT CHANGE UP (ref 135–145)
WBC # BLD: 10.64 K/UL — HIGH (ref 3.8–10.5)
WBC # FLD AUTO: 10.64 K/UL — HIGH (ref 3.8–10.5)

## 2024-12-12 PROCEDURE — 70496 CT ANGIOGRAPHY HEAD: CPT | Mod: MC

## 2024-12-12 PROCEDURE — 70450 CT HEAD/BRAIN W/O DYE: CPT | Mod: MC

## 2024-12-12 PROCEDURE — 99284 EMERGENCY DEPT VISIT MOD MDM: CPT | Mod: 25

## 2024-12-12 PROCEDURE — 80048 BASIC METABOLIC PNL TOTAL CA: CPT

## 2024-12-12 PROCEDURE — 70498 CT ANGIOGRAPHY NECK: CPT | Mod: 26,MC

## 2024-12-12 PROCEDURE — 70450 CT HEAD/BRAIN W/O DYE: CPT | Mod: 26,MC,59

## 2024-12-12 PROCEDURE — 99285 EMERGENCY DEPT VISIT HI MDM: CPT

## 2024-12-12 PROCEDURE — 70496 CT ANGIOGRAPHY HEAD: CPT | Mod: 26,MC

## 2024-12-12 PROCEDURE — 84702 CHORIONIC GONADOTROPIN TEST: CPT

## 2024-12-12 PROCEDURE — 36415 COLL VENOUS BLD VENIPUNCTURE: CPT

## 2024-12-12 PROCEDURE — 85025 COMPLETE CBC W/AUTO DIFF WBC: CPT

## 2024-12-12 PROCEDURE — 70498 CT ANGIOGRAPHY NECK: CPT | Mod: MC

## 2024-12-12 RX ORDER — SODIUM CHLORIDE 9 MG/ML
1000 INJECTION, SOLUTION INTRAMUSCULAR; INTRAVENOUS; SUBCUTANEOUS ONCE
Refills: 0 | Status: COMPLETED | OUTPATIENT
Start: 2024-12-12 | End: 2024-12-12

## 2024-12-12 RX ADMIN — SODIUM CHLORIDE 2000 MILLILITER(S): 9 INJECTION, SOLUTION INTRAMUSCULAR; INTRAVENOUS; SUBCUTANEOUS at 19:54

## 2024-12-12 NOTE — ED ADULT NURSE NOTE - OBJECTIVE STATEMENT
Pt reports headache and nausea that started  around 3 am. Pt reports site is now very tender but not painful

## 2024-12-12 NOTE — ED ADULT TRIAGE NOTE - HISTORY OF COVID-19 VACCINATION
Problem: Discharge Planning  Goal: Discharge to home or other facility with appropriate resources  11/3/2023 1733 by Megan Walker RN  Outcome: Adequate for Discharge  11/3/2023 1046 by Kings Castellanos RN  Outcome: Progressing     Problem: Nutrition Deficit:  Goal: Optimize nutritional status  11/3/2023 1733 by Megan Walker RN  Outcome: Adequate for Discharge  11/3/2023 1046 by Kings Castellanos RN  Outcome: Progressing     Problem: Hematologic - Adult  Goal: Maintains hematologic stability  11/3/2023 1733 by Megan Walker RN  Outcome: Adequate for Discharge  11/3/2023 1046 by Kings Castellanos RN  Outcome: Progressing Yes

## 2024-12-12 NOTE — ED PROVIDER NOTE - PROGRESS NOTE DETAILS
Patient CT negative for acute pathology.  Patient feeling well, currently pain-free, will plan for discharge.  Plan to discharge patient. Return to ED precautions were discussed with the patient/family. All questions were answered. Cornelio Recinos MD.

## 2024-12-12 NOTE — ED PROVIDER NOTE - PROVIDER TOKENS
UNABLE TO GET SPO2 AT [address], RN  03/10/18 6057 PROVIDER:[TOKEN:[[id number]],FOLLOWUP:[7-10 Days]]

## 2024-12-12 NOTE — ED ADULT NURSE REASSESSMENT NOTE - NS ED NURSE REASSESS COMMENT FT1
Pt to dc home and follow up out pt with neurology; instructed to take otc pain meds as needed.  Pt expressed understanding of dc instructions.
Received pt from [facility].  Pt resting comfortably in bed at this time, reports headache is better at this time, 2/10.  Denies chest pain or SOB.  No n/v/d.  Pt states pain was very intense to left side of head early this morning.  h/o migraines but states this felt different.  Currently does not taker any medications.  Labs pending.  Maintain comfort.

## 2024-12-12 NOTE — ED PROVIDER NOTE - OBJECTIVE STATEMENT
Patient with a past medical history of migraines is presenting for left-sided headache.  States it woke her from her sleep last night.  Lasted for about 2 hours and self resolved.  Over the course of the day she did have some pain with coughing or sneezing but otherwise was pain-free.  No vomiting.  States feels slightly different than prior headaches.  No neck pain or neck stiffness.  No fevers.

## 2024-12-12 NOTE — ED PROVIDER NOTE - NSDCPRINTRESULTS_ED_ALL_ED
Client met with CM to do Re-Ast and Re-Cert  Client reports chronic conditions that are under control  Client takes medications daily  Client is not active with dental   Client is using protection regularly  Client reports no history of addictions  Client has history of mental health, but not active  Client has health insurance  Client reports stable housing  Client has no history of domestic violence  Client can perform all ADLs  Client has steady income  Client has transportation  Client has good support  Client needs occasional help with forms  Client has no legal issues  Client has no nutritional issues  Client did Re-Cert    CM and client did client's SPBP renewal 
Patient requests all Lab, Cardiology, and Radiology Results on their Discharge Instructions

## 2024-12-12 NOTE — ED PROVIDER NOTE - PATIENT PORTAL LINK FT
You can access the FollowMyHealth Patient Portal offered by [facility] by registering at the following website: [email or web address]. By joining [facility]’s FollowMyHealth portal, you will also be able to view your health information using other applications (apps) compatible with our system.

## 2024-12-12 NOTE — ED ADULT NURSE REASSESSMENT NOTE - NSFALLUNIVINTERV_ED_ALL_ED
Bed/Stretcher in lowest position, wheels locked, appropriate side rails in place/Call bell, personal items and telephone in reach/Instruct patient to call for assistance before getting out of bed/chair/stretcher/Non-slip footwear applied when patient is off stretcher/[place] to call system/Physically safe environment - no spills, clutter or unnecessary equipment/Purposeful proactive rounding/Room/bathroom lighting operational, light cord in reach

## 2024-12-12 NOTE — ED PROVIDER NOTE - CLINICAL SUMMARY MEDICAL DECISION MAKING FREE TEXT BOX
Given patient's description of pain, will check CTA head and neck to evaluate for aneurysm though she is currently pain-free with a nonlocalizing neurologic exam so I feel this is less likely at this time.  Possibility of migraine given her history of migraines.  No fever to suggest meningitis.  No localizing neurologic deficits to suggest cerebral venous thrombosis.  Plan on lab work, imaging and reassessment.  Patient declining pain meds at this time

## 2024-12-12 NOTE — ED PROVIDER NOTE - PHYSICAL EXAMINATION
Constitutional: Awake, Alert, non-toxic. No acute distress.  HEAD: Normocephalic, atraumatic.   EYES: PERRL, EOM intact, conjunctiva and sclera are clear bilaterally.  ENT: External ears normal. No rhinorrhea, no tracheal deviation   NECK: Supple, non-tender  CARDIOVASCULAR: regular rate and rhythm.  RESPIRATORY: Normal respiratory effort; breath sounds CTAB, no wheezes, rhonchi, or rales. Speaking in full sentences. No accessory muscle use.   ABDOMEN: Soft; non-tender, non-distended. No rebound or guarding.   MSK:  no lower extremity edema, no deformities  SKIN: Warm, dry  NEURO: A&O x3. Sensory and motor functions are grossly intact. Speech is normal. CN 2-12 in tact. No facial droop. Normal finger to nose. Negative pronator drift. Normal heel to shin. 5/5 strength in bilateral upper and lower extremities. Sensation in tact to light touch in bilateral upper and lower extremities.   PSYCH: Appearance and judgement seem appropriate for gender and age.

## 2024-12-24 ENCOUNTER — OFFICE (OUTPATIENT)
Dept: URBAN - METROPOLITAN AREA CLINIC 109 | Facility: CLINIC | Age: 29
Setting detail: OPHTHALMOLOGY
End: 2024-12-24
Payer: COMMERCIAL

## 2024-12-24 DIAGNOSIS — H43.393: ICD-10-CM

## 2024-12-24 DIAGNOSIS — H16.223: ICD-10-CM

## 2024-12-24 PROCEDURE — 92014 COMPRE OPH EXAM EST PT 1/>: CPT | Performed by: OPHTHALMOLOGY

## 2024-12-24 ASSESSMENT — REFRACTION_CURRENTRX
OD_CYLINDER: -1.00
OD_SPHERE: PLANO
OD_OVR_VA: 20/
OS_AXIS: 095
OD_OVR_VA: 20/
OS_OVR_VA: 20/
OS_SPHERE: -0.25
OS_CYLINDER: -0.75
OD_SPHERE: -0.50
OS_OVR_VA: 20/
OS_SPHERE: -0.25
OD_AXIS: 105
OS_AXIS: 88
OD_CYLINDER: -1.00
OS_CYLINDER: -0.75
OD_AXIS: 110

## 2024-12-24 ASSESSMENT — VISUAL ACUITY
OS_BCVA: 20/20
OD_BCVA: 20/15

## 2024-12-24 ASSESSMENT — TONOMETRY
OS_IOP_MMHG: 16
OD_IOP_MMHG: 16

## 2024-12-24 ASSESSMENT — REFRACTION_MANIFEST
OS_VA1: 20/20
OS_AXIS: 95
OD_CYLINDER: -1.00
OS_CYLINDER: -0.75
OD_SPHERE: -0.50
OS_SPHERE: -0.25
OD_VA1: 20/20
OD_AXIS: 105

## 2024-12-24 ASSESSMENT — REFRACTION_AUTOREFRACTION
OD_SPHERE: -1.00
OS_AXIS: 88
OS_SPHERE: -0.50
OD_AXIS: 108
OS_CYLINDER: -0.75
OD_CYLINDER: -0.50

## 2025-06-24 ENCOUNTER — APPOINTMENT (OUTPATIENT)
Dept: FAMILY MEDICINE | Facility: CLINIC | Age: 30
End: 2025-06-24

## 2025-06-24 VITALS
SYSTOLIC BLOOD PRESSURE: 100 MMHG | BODY MASS INDEX: 25.99 KG/M2 | HEIGHT: 65 IN | WEIGHT: 156 LBS | TEMPERATURE: 98 F | OXYGEN SATURATION: 98 % | HEART RATE: 88 BPM | DIASTOLIC BLOOD PRESSURE: 80 MMHG

## 2025-06-24 PROCEDURE — 90471 IMMUNIZATION ADMIN: CPT

## 2025-06-24 PROCEDURE — 90715 TDAP VACCINE 7 YRS/> IM: CPT

## 2025-06-24 PROCEDURE — 99395 PREV VISIT EST AGE 18-39: CPT | Mod: 25,GC

## 2025-06-24 PROCEDURE — 99213 OFFICE O/P EST LOW 20 MIN: CPT | Mod: 25,GC

## 2025-06-24 PROCEDURE — 36415 COLL VENOUS BLD VENIPUNCTURE: CPT

## 2025-06-30 ENCOUNTER — NON-APPOINTMENT (OUTPATIENT)
Age: 30
End: 2025-06-30

## 2025-06-30 LAB
A VAGINAE DNA VAG QL NAA+PROBE: NORMAL
BASOPHILS # BLD AUTO: 0.03 K/UL
BASOPHILS NFR BLD AUTO: 0.3 %
BVAB2 DNA VAG QL NAA+PROBE: NORMAL
C KRUSEI DNA VAG QL NAA+PROBE: NEGATIVE
C KRUSEI DNA VAG QL NAA+PROBE: POSITIVE
C TRACH RRNA SPEC QL NAA+PROBE: NEGATIVE
CANDIDA DNA VAG QL NAA+PROBE: NEGATIVE
EOSINOPHIL # BLD AUTO: 0.07 K/UL
EOSINOPHIL NFR BLD AUTO: 0.8 %
ESTIMATED AVERAGE GLUCOSE: 105 MG/DL
HBA1C MFR BLD HPLC: 5.3 %
HCT VFR BLD CALC: 45.4 %
HGB BLD-MCNC: 13.9 G/DL
IMM GRANULOCYTES NFR BLD AUTO: 0.3 %
LYMPHOCYTES # BLD AUTO: 3.43 K/UL
LYMPHOCYTES NFR BLD AUTO: 39.4 %
MAN DIFF?: NORMAL
MCHC RBC-ENTMCNC: 27.5 PG
MCHC RBC-ENTMCNC: 30.6 G/DL
MCV RBC AUTO: 89.9 FL
MEGA1 DNA VAG QL NAA+PROBE: NORMAL
MONOCYTES # BLD AUTO: 0.55 K/UL
MONOCYTES NFR BLD AUTO: 6.3 %
N GONORRHOEA RRNA SPEC QL NAA+PROBE: NEGATIVE
NEUTROPHILS # BLD AUTO: 4.59 K/UL
NEUTROPHILS NFR BLD AUTO: 52.9 %
PLATELET # BLD AUTO: 295 K/UL
RBC # BLD: 5.05 M/UL
RBC # FLD: 13.2 %
T VAGINALIS RRNA SPEC QL NAA+PROBE: NEGATIVE
WBC # FLD AUTO: 8.7 K/UL

## 2025-06-30 RX ORDER — FLUCONAZOLE 150 MG/1
150 TABLET ORAL
Qty: 1 | Refills: 0 | Status: ACTIVE | COMMUNITY
Start: 2025-06-30 | End: 1900-01-01